# Patient Record
Sex: MALE | Race: WHITE | NOT HISPANIC OR LATINO | URBAN - METROPOLITAN AREA
[De-identification: names, ages, dates, MRNs, and addresses within clinical notes are randomized per-mention and may not be internally consistent; named-entity substitution may affect disease eponyms.]

---

## 2024-03-10 ENCOUNTER — HOSPITAL ENCOUNTER (EMERGENCY)
Facility: HOSPITAL | Age: 54
Discharge: LEFT BEFORE DISCHARGE | End: 2024-03-10
Attending: EMERGENCY MEDICINE
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
## 2024-03-10 PROCEDURE — 99283 EMERGENCY DEPT VISIT LOW MDM: CPT

## 2024-03-11 NOTE — ED NOTES
Pt walking around ED parking lot, [place] [employer] called again as they had not been seen by [facility] or hospital staff, asked to come back to ED, [place] [employer] seen pulling up on patient      Zabrina Otero RN  03/10/24 6099
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
## 2024-04-01 ENCOUNTER — APPOINTMENT (OUTPATIENT)
Dept: RADIOLOGY | Facility: HOSPITAL | Age: 54
End: 2024-04-01

## 2024-04-01 ENCOUNTER — HOSPITAL ENCOUNTER (EMERGENCY)
Facility: HOSPITAL | Age: 54
Discharge: HOME | End: 2024-04-01
Attending: EMERGENCY MEDICINE

## 2024-04-01 ENCOUNTER — APPOINTMENT (OUTPATIENT)
Dept: CARDIOLOGY | Facility: HOSPITAL | Age: 54
End: 2024-04-01

## 2024-04-01 VITALS
TEMPERATURE: 97.5 F | HEART RATE: 82 BPM | BODY MASS INDEX: 30.8 KG/M2 | RESPIRATION RATE: 16 BRPM | HEIGHT: 71 IN | OXYGEN SATURATION: 100 % | DIASTOLIC BLOOD PRESSURE: 76 MMHG | WEIGHT: 220 LBS | SYSTOLIC BLOOD PRESSURE: 148 MMHG

## 2024-04-01 DIAGNOSIS — M25.532 LEFT WRIST PAIN: Primary | ICD-10-CM

## 2024-04-01 DIAGNOSIS — M25.512 ACUTE PAIN OF LEFT SHOULDER: ICD-10-CM

## 2024-04-01 LAB
ANION GAP SERPL CALC-SCNC: 13 MMOL/L
BASOPHILS # BLD AUTO: 0.02 X10*3/UL (ref 0–0.1)
BASOPHILS NFR BLD AUTO: 0.2 %
BUN SERPL-MCNC: 9 MG/DL (ref 8–25)
CALCIUM SERPL-MCNC: 10.1 MG/DL (ref 8.5–10.4)
CHLORIDE SERPL-SCNC: 100 MMOL/L (ref 97–107)
CO2 SERPL-SCNC: 25 MMOL/L (ref 24–31)
CREAT SERPL-MCNC: 0.8 MG/DL (ref 0.4–1.6)
EGFRCR SERPLBLD CKD-EPI 2021: >90 ML/MIN/1.73M*2
EOSINOPHIL # BLD AUTO: 0.03 X10*3/UL (ref 0–0.7)
EOSINOPHIL NFR BLD AUTO: 0.3 %
ERYTHROCYTE [DISTWIDTH] IN BLOOD BY AUTOMATED COUNT: 12.5 % (ref 11.5–14.5)
GLUCOSE SERPL-MCNC: 110 MG/DL (ref 65–99)
HCT VFR BLD AUTO: 43.5 % (ref 41–52)
HGB BLD-MCNC: 14.2 G/DL (ref 13.5–17.5)
IMM GRANULOCYTES # BLD AUTO: 0.03 X10*3/UL (ref 0–0.7)
IMM GRANULOCYTES NFR BLD AUTO: 0.3 % (ref 0–0.9)
LYMPHOCYTES # BLD AUTO: 1.65 X10*3/UL (ref 1.2–4.8)
LYMPHOCYTES NFR BLD AUTO: 17.7 %
MCH RBC QN AUTO: 31.9 PG (ref 26–34)
MCHC RBC AUTO-ENTMCNC: 32.6 G/DL (ref 32–36)
MCV RBC AUTO: 98 FL (ref 80–100)
MONOCYTES # BLD AUTO: 0.88 X10*3/UL (ref 0.1–1)
MONOCYTES NFR BLD AUTO: 9.4 %
NEUTROPHILS # BLD AUTO: 6.72 X10*3/UL (ref 1.2–7.7)
NEUTROPHILS NFR BLD AUTO: 72.1 %
NRBC BLD-RTO: 0 /100 WBCS (ref 0–0)
PLATELET # BLD AUTO: 347 X10*3/UL (ref 150–450)
POTASSIUM SERPL-SCNC: 4.5 MMOL/L (ref 3.4–5.1)
RBC # BLD AUTO: 4.45 X10*6/UL (ref 4.5–5.9)
SODIUM SERPL-SCNC: 138 MMOL/L (ref 133–145)
TROPONIN T SERPL-MCNC: 12 NG/L
WBC # BLD AUTO: 9.3 X10*3/UL (ref 4.4–11.3)

## 2024-04-01 PROCEDURE — 2500000004 HC RX 250 GENERAL PHARMACY W/ HCPCS (ALT 636 FOR OP/ED)

## 2024-04-01 PROCEDURE — 73090 X-RAY EXAM OF FOREARM: CPT | Mod: LT

## 2024-04-01 PROCEDURE — 73090 X-RAY EXAM OF FOREARM: CPT | Mod: LEFT SIDE | Performed by: RADIOLOGY

## 2024-04-01 PROCEDURE — 93005 ELECTROCARDIOGRAM TRACING: CPT

## 2024-04-01 PROCEDURE — 80048 BASIC METABOLIC PNL TOTAL CA: CPT | Performed by: EMERGENCY MEDICINE

## 2024-04-01 PROCEDURE — 73130 X-RAY EXAM OF HAND: CPT | Mod: LT

## 2024-04-01 PROCEDURE — 85025 COMPLETE CBC W/AUTO DIFF WBC: CPT | Performed by: EMERGENCY MEDICINE

## 2024-04-01 PROCEDURE — 73030 X-RAY EXAM OF SHOULDER: CPT | Mod: LEFT SIDE | Performed by: RADIOLOGY

## 2024-04-01 PROCEDURE — 73130 X-RAY EXAM OF HAND: CPT | Mod: LEFT SIDE | Performed by: RADIOLOGY

## 2024-04-01 PROCEDURE — 93971 EXTREMITY STUDY: CPT | Performed by: RADIOLOGY

## 2024-04-01 PROCEDURE — 99285 EMERGENCY DEPT VISIT HI MDM: CPT | Mod: 25

## 2024-04-01 PROCEDURE — 73030 X-RAY EXAM OF SHOULDER: CPT | Mod: LT

## 2024-04-01 PROCEDURE — 84484 ASSAY OF TROPONIN QUANT: CPT | Performed by: EMERGENCY MEDICINE

## 2024-04-01 PROCEDURE — 93971 EXTREMITY STUDY: CPT

## 2024-04-01 PROCEDURE — 36415 COLL VENOUS BLD VENIPUNCTURE: CPT | Performed by: EMERGENCY MEDICINE

## 2024-04-01 PROCEDURE — 96372 THER/PROPH/DIAG INJ SC/IM: CPT

## 2024-04-01 RX ORDER — ACETAMINOPHEN 325 MG/1
650 TABLET ORAL ONCE
Status: COMPLETED | OUTPATIENT
Start: 2024-04-01 | End: 2024-04-01

## 2024-04-01 RX ORDER — KETOROLAC TROMETHAMINE 30 MG/ML
15 INJECTION, SOLUTION INTRAMUSCULAR; INTRAVENOUS ONCE
Status: COMPLETED | OUTPATIENT
Start: 2024-04-01 | End: 2024-04-01

## 2024-04-01 RX ADMIN — KETOROLAC TROMETHAMINE 15 MG: 30 INJECTION, SOLUTION INTRAMUSCULAR at 15:05

## 2024-04-01 RX ADMIN — ACETAMINOPHEN 650 MG: 325 TABLET ORAL at 15:05

## 2024-04-01 ASSESSMENT — PAIN DESCRIPTION - ORIENTATION: ORIENTATION: LEFT

## 2024-04-01 ASSESSMENT — PAIN DESCRIPTION - PROGRESSION: CLINICAL_PROGRESSION: GRADUALLY WORSENING

## 2024-04-01 ASSESSMENT — PAIN DESCRIPTION - ONSET: ONSET: ONGOING

## 2024-04-01 ASSESSMENT — LIFESTYLE VARIABLES
EVER HAD A DRINK FIRST THING IN THE MORNING TO STEADY YOUR NERVES TO GET RID OF A HANGOVER: NO
HAVE YOU EVER FELT YOU SHOULD CUT DOWN ON YOUR DRINKING: NO
TOTAL SCORE: 0
EVER FELT BAD OR GUILTY ABOUT YOUR DRINKING: NO
HAVE PEOPLE ANNOYED YOU BY CRITICIZING YOUR DRINKING: NO

## 2024-04-01 ASSESSMENT — PAIN DESCRIPTION - PAIN TYPE: TYPE: ACUTE PAIN

## 2024-04-01 ASSESSMENT — PAIN DESCRIPTION - LOCATION: LOCATION: WRIST

## 2024-04-01 ASSESSMENT — PAIN - FUNCTIONAL ASSESSMENT: PAIN_FUNCTIONAL_ASSESSMENT: 0-10

## 2024-04-01 ASSESSMENT — PAIN DESCRIPTION - FREQUENCY: FREQUENCY: CONSTANT/CONTINUOUS

## 2024-04-01 ASSESSMENT — PAIN DESCRIPTION - DESCRIPTORS: DESCRIPTORS: ACHING;THROBBING

## 2024-04-01 ASSESSMENT — PAIN SCALES - GENERAL: PAINLEVEL_OUTOF10: 5 - MODERATE PAIN

## 2024-04-01 NOTE — Clinical Note
Anibal Alarcon was seen and treated in our emergency department on 4/1/2024.  He may return to work on 04/02/2024.  No heavy lifting or use of left arm until cleared by orhtopedics or PCP.     If you have any questions or concerns, please don't hesitate to call.      Verenice Taylor PA-C

## 2024-04-01 NOTE — DISCHARGE INSTRUCTIONS
Follow-up closely primary care provider in the following 1 to 2 days for recheck of symptoms.  New provider given to follow-up closely with.    Continue Tylenol and ibuprofen as needed for aches and pains.    Continue wearing Ace wrap and sling for comfort.  Continue gentle range of motion stretching.  Follow-up closely with orthopedics listed.

## 2024-04-01 NOTE — PROGRESS NOTES
Attestation/Supervisory note for RONY Taylor      The patient is a 53-year-old male presenting to the emergency department for evaluation of left leg and left arm pain for the past 2 to 3 weeks.  He states he initially was having left leg pain after he slipped on some ice.  He states he was able to catch himself but he still was having pain.  He assumed it was musculoskeletal.  He states that he started having left arm pain shortly after that.  He states that the pain is from his shoulder to his wrist.  He states that it primarily is in his wrist.  He states it is somewhat painful when he is moving these areas.  No other better or worse.  He denies any headache or visual changes.  No chest pain or shortness of breath.  No headache or visual changes.  No focal weakness or numbness.  No neck or back pain.  No abdominal pain.  No nausea or vomiting.  No diarrhea or constipation.  No urinary complaints.  He denies any history of CAD or ACS.  No history of PE or DVT.  He denies having any chronic medical conditions but he also admits that he does not regularly follow-up with a primary care physician.  All pertinent positives and negatives are recorded above.  All other systems reviewed and otherwise negative.  Vital signs within normal limits.  Physical exam with a well-nourished well-developed male in no acute distress.  HEENT exam within normal limits.  He has no evidence of airway compromise or respiratory distress.  Abdominal exam is benign.  He has no gross motor, neurologic or vascular deficits on exam.        EKG with sinus rhythm at 76 bpm, normal axis, normal voltage, normal ST segment, normal T waves      Oral acetaminophen and IV Toradol ordered.      Diagnostic labs without significant abnormality.        Troponin T of 12      XR hand left 3+ views   Final Result   Mild degenerative changes as above without acute fracture or   subluxation. Mild soft tissue swelling over the dorsum of the hand.        Signed by:  Hilario Sanon 4/1/2024 3:36 PM   Dictation workstation:   [id number]      XR forearm left 2 views   Final Result   Mild degenerative changes as above without acute fracture or   subluxation. Mild soft tissue swelling over the dorsum of the hand.        Signed by: Hilario Sanon 4/1/2024 3:36 PM   Dictation workstation:   [id number]      XR shoulder left 2+ views   Final Result   No evidence for acute osseous abnormality of the left shoulder.        Signed by: Jesse Saul 4/1/2024 3:32 PM   Dictation workstation:   [id number]      Vascular US upper extremity venous duplex left   Final Result   Negative study.  No thrombus in the central veins of the left upper   extremities.        MACRO:   None        Signed by: Jesse Saul 4/1/2024 2:59 PM   Dictation workstation:   [id number]           The patient does not have any evidence of ischemia on EKG or cardiac enzymes.  No events on telemetry.  The physical exam and diagnostic imaging does not show any evidence of fracture or dislocation.  Duplex of the left upper extremity shows no evidence of DVT.      The patient was released in good condition.  He will follow-up with his primary care physician within 1 to 2 days for further management of his current symptoms.  He will return to the emergency department sooner with worsening of symptoms or onset of new symptoms.  Rx given for ibuprofen.      Impression/diagnosis  1.  Left upper extremity pain  2.  Left lower extremity pain        I personally saw the patient and made/approve the management plan and take responsibility for the patient management.      I independently interpreted the following study (S): EKG and diagnostic labs      I personally discussed the patient's management with the patient      I reviewed the results of the diagnostic labs and diagnostic imaging.  Formal radiology read was completed by the radiologist.      Sally Neely MD

## 2024-04-01 NOTE — ED PROVIDER NOTES
HPI   Chief Complaint   Patient presents with    Arm Pain       Patient is a 53-year-old male who presents emergency department for evaluation of left arm pain.  Patient states that it started a few days ago.  He states is a constant dull aching pain in his wrist and hand.  He states occasionally will radiate up the left arm into his shoulder.  He states that he has not recall any injury or trauma.  He states that he was having some issues with his left extra lower extremity which he was seen here for previously which was musculoskeletal in nature.  He states that sometimes when the pain is severe feels like the knocks the wind out of him.  He denies any chest pain or shortness of breath at this time.  He denies any lightheadedness, dizziness, neck pain, numbness, tingling, fevers, chills or other symptoms at this time.      History provided by:  Patient   used: No                        No data recorded                   Patient History   No past medical history on file.  No past surgical history on file.  No family history on file.  Social History     Tobacco Use    Smoking status: Not on file    Smokeless tobacco: Not on file   Substance Use Topics    Alcohol use: Not on file    Drug use: Not on file       Physical Exam   ED Triage Vitals [04/01/24 1408]   Temperature Heart Rate Respirations BP   36.4 °C (97.5 °F) 82 16 148/76      Pulse Ox Temp Source Heart Rate Source Patient Position   100 % Temporal Monitor Sitting      BP Location FiO2 (%)     Right arm --       Physical Exam  Constitutional:       Appearance: Normal appearance.   Cardiovascular:      Rate and Rhythm: Normal rate and regular rhythm.   Pulmonary:      Effort: Pulmonary effort is normal.      Breath sounds: Normal breath sounds.   Abdominal:      General: Abdomen is flat.      Palpations: Abdomen is soft.      Tenderness: There is no abdominal tenderness.   Musculoskeletal:         General: Tenderness present.       Comments: Tenderness to palpation over left wrist with decreased range of motion due to pain.   strength reduced due to pain with sensation intact.  Good distal pulses to left upper extremity.  Good range of motion to left elbow and left shoulder.   Skin:     General: Skin is warm and dry.   Neurological:      General: No focal deficit present.      Mental Status: He is alert and oriented to person, place, and time.         ED Course & MDM   Diagnoses as of 04/01/24 1906   Left wrist pain   Acute pain of left shoulder       Medical Decision Making  Patient is a 53-year-old male who presents emergency department for evaluation of left arm and wrist pain.    EKG was interpreted by attending physician and reviewed by me.    Lab work done today included BMP, CBC, troponins.  Lab work without significant abnormality with troponin within normal range.      Scans done today were interpreted/confirmed by radiologist and also interpreted by me which included x-ray left hand, x-ray left forearm, x-ray left shoulder.  Ultrasound shows no thrombus in the central veins of the left upper extremity with negative study.  X-rays show no evidence for acute osseous abnormality of the left shoulder, mild degenerative changes without acute fracture or subluxation with mild soft tissue swelling over the dorsum of hand and wrist.    Medications given at today's visit include IM Toradol and p.o. Tylenol    I saw this patient in conjunction with Dr. Neely.  EKG shows no evidence of ischemia and troponin within normal range.  Patient not having any active chest pain at this time only left arm pain. patient does have some inflammatory changes to left wrist and hand with pain with range of motion.  X-ray did not show any evidence of acute bony abnormality but do show some mild degenerative changes.  He does have range of motion and sensation intact.  Venous duplex of left upper extremity does not show any evidence of DVT or vascular  abnormality.  Suspect patient symptoms likely related to musculoskeletal pain and patient was placed in Ace wrap by nursing staff.  Patient neurovascularly intact following Ace wrap application and sling application by nursing staff.  Patient was educated on continued symptom management and close follow-up with primary care provider in the following 1 to 2 days.  Patient agreeable to discharge and plan moving forward.  Emergent pathologies were considered for this patient, although I have low suspicion for anything acutely emergent given patient's clinical presentation, history, physical exam, stable vital signs, and relatively unremarkable workup.  Discharging patient home is reasonable plan of care for outpatient management.    All labs, imaging, and diagnostic studies were reviewed by me and patient was counseled on clinical impression, expectations, and plan.  Patient was educated to follow-up with PCP in the following 1-2 days.  All questions from patient were answered. They elicited understanding and were agreeable to course of treatment.  Patient was discharged in stable condition and given strict return precautions.    ** Disclaimer:  Parts of this document were written utilizing a voice to text dictation software.  Note may contain minor transcription or typographical errors that were inadvertently transcribed by the computer software.        Procedure  Procedures     Verenice Taylor PA-C  04/01/24 1888

## 2024-04-01 NOTE — LETTER
April 1, 2024    Patient: Anibal Alarcon   YOB: 1970   Date of Visit: 4/1/2024       To Whom It May Concern:    Anibal Alarcon was seen and treated in our emergency department on 4/1/2024. He {Return to school/sport/work:97176}.    If you have any questiWons or concerns, please don't hesitate to call.              CC:   No Recipients

## 2024-04-01 NOTE — ED TRIAGE NOTES
Pt states that he is having pain in his left arm that started yesterday. Pt denies cp/sob. Pt denies injury to the arm.

## 2024-04-02 LAB
ATRIAL RATE: 76 BPM
P AXIS: 33 DEGREES
P OFFSET: 220 MS
P ONSET: 154 MS
PR INTERVAL: 138 MS
Q ONSET: 223 MS
QRS COUNT: 13 BEATS
QRS DURATION: 84 MS
QT INTERVAL: 372 MS
QTC CALCULATION(BAZETT): 418 MS
QTC FREDERICIA: 402 MS
R AXIS: 45 DEGREES
T AXIS: 54 DEGREES
T OFFSET: 409 MS
VENTRICULAR RATE: 76 BPM